# Patient Record
Sex: MALE | Race: BLACK OR AFRICAN AMERICAN | URBAN - METROPOLITAN AREA
[De-identification: names, ages, dates, MRNs, and addresses within clinical notes are randomized per-mention and may not be internally consistent; named-entity substitution may affect disease eponyms.]

---

## 2018-09-11 ENCOUNTER — ED HISTORICAL/CONVERTED ENCOUNTER (OUTPATIENT)
Dept: OTHER | Age: 58
End: 2018-09-11

## 2024-07-20 ENCOUNTER — APPOINTMENT (OUTPATIENT)
Facility: HOSPITAL | Age: 64
End: 2024-07-20
Payer: OTHER GOVERNMENT

## 2024-07-20 ENCOUNTER — HOSPITAL ENCOUNTER (EMERGENCY)
Facility: HOSPITAL | Age: 64
Discharge: HOME | End: 2024-07-20
Payer: OTHER GOVERNMENT

## 2024-07-20 VITALS
WEIGHT: 130 LBS | RESPIRATION RATE: 18 BRPM | TEMPERATURE: 98 F | OXYGEN SATURATION: 96 % | BODY MASS INDEX: 22.2 KG/M2 | SYSTOLIC BLOOD PRESSURE: 128 MMHG | HEIGHT: 64 IN | DIASTOLIC BLOOD PRESSURE: 76 MMHG | HEART RATE: 91 BPM

## 2024-07-20 DIAGNOSIS — S81.811A LACERATION OF RIGHT LOWER LEG WITHOUT COMPLICATION, INITIAL ENCOUNTER: Primary | ICD-10-CM

## 2024-07-20 PROCEDURE — 73590 X-RAY EXAM OF LOWER LEG: CPT

## 2024-07-20 PROCEDURE — 12002 RPR S/N/AX/GEN/TRNK2.6-7.5CM: CPT

## 2024-07-20 PROCEDURE — 99283 EMERGENCY DEPT VISIT LOW MDM: CPT

## 2024-07-20 PROCEDURE — 6370000000 HC RX 637 (ALT 250 FOR IP): Performed by: PHYSICIAN ASSISTANT

## 2024-07-20 PROCEDURE — 2500000003 HC RX 250 WO HCPCS: Performed by: PHYSICIAN ASSISTANT

## 2024-07-20 RX ORDER — LIDOCAINE HYDROCHLORIDE 10 MG/ML
10 INJECTION, SOLUTION INFILTRATION; PERINEURAL
Status: COMPLETED | OUTPATIENT
Start: 2024-07-20 | End: 2024-07-20

## 2024-07-20 RX ORDER — IBUPROFEN 600 MG/1
600 TABLET ORAL EVERY 6 HOURS PRN
Qty: 20 TABLET | Refills: 0 | Status: SHIPPED | OUTPATIENT
Start: 2024-07-20

## 2024-07-20 RX ORDER — BACITRACIN ZINC 500 [USP'U]/G
OINTMENT TOPICAL ONCE
Status: COMPLETED | OUTPATIENT
Start: 2024-07-20 | End: 2024-07-20

## 2024-07-20 RX ADMIN — BACITRACIN ZINC: 500 OINTMENT TOPICAL at 13:15

## 2024-07-20 RX ADMIN — LIDOCAINE HYDROCHLORIDE 10 ML: 10 INJECTION, SOLUTION INFILTRATION; PERINEURAL at 11:03

## 2024-07-20 ASSESSMENT — PAIN DESCRIPTION - LOCATION: LOCATION: LEG

## 2024-07-20 ASSESSMENT — PAIN - FUNCTIONAL ASSESSMENT
PAIN_FUNCTIONAL_ASSESSMENT: NONE - DENIES PAIN
PAIN_FUNCTIONAL_ASSESSMENT: 0-10
PAIN_FUNCTIONAL_ASSESSMENT: ACTIVITIES ARE NOT PREVENTED

## 2024-07-20 ASSESSMENT — PAIN DESCRIPTION - DESCRIPTORS: DESCRIPTORS: THROBBING

## 2024-07-20 ASSESSMENT — PAIN SCALES - GENERAL
PAINLEVEL_OUTOF10: 5
PAINLEVEL_OUTOF10: 0

## 2024-07-20 ASSESSMENT — PAIN DESCRIPTION - PAIN TYPE: TYPE: ACUTE PAIN

## 2024-07-20 ASSESSMENT — PAIN DESCRIPTION - ORIENTATION: ORIENTATION: LOWER;RIGHT
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
## 2024-07-20 NOTE — ED NOTES
Dressing placed over suture line with non-adherent guaze, covered with adaptic and kerlix. Instructed patient on wound care, he is able to return verbalize instructions.
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
## 2024-07-20 NOTE — DISCHARGE INSTRUCTIONS
PRIMARY CARE in [facility]:  [address] [place] [address].  [phone]  Jessi Myers MD Family Medicine  Jose Francisco Chery MD Family Medicine  Dagoberto Valadez MD Family Medicine    [facility]: [address] [place] [address]. [phone]   Francis Cummins MD Family Medicine  Jodi Parson, ROSIBEL Family Medicine  Polina Mcbride, ROSIBEL Family Medicine    [facility]: [address], [address], [place] [address]. [phone]  Nanda Ballard MD Family Medicine  Enriqueta Gonsales MD Family Medicine  Sohan Fields, ROS[facility]EL Family Medicine  Keyla Mccurdy, ROSIBEL Family Medicine    [facility]: [address], [address] [address]. [phone]  Bernadine Campa MD Internal Medicine  Sourav Horton MD Internal Medicine  Thom Goodrich MD Internal Medicine  Janee Allen, PA Family Medicine    [facility]: [address], [place] [address]. [phone]  Alexia Hinojosa MD Family Medicine  Janie France MD Family Medicine  Danny Luna, DO Infectious Disease  Otis Baker MD Family Medicine    [facility]: [address], [address], [place] [address]. [phone]  Patito Richards,  Family Medicine  Sadia Fields NP Internal Medicine  Yaima Sharp, ROSIBEL Internal Medicine    [facility]: [address] [place], [place] [address]. [phone]  Horacio Lombardo MD Internal Medicine  Héctor Dave MD Internal Medicine    [facility]: [address] [place] [address]. [phone]  Chely Santoro MD Family Medicine  Tess Carrizales MD Family Medicine  Baltazar Angulo MD Family Medicine  Tara Gamboa, ROSIBEL Family Medicine  Aba Fields, ARPN, CNP Family Medicine    [facility]

## 2024-07-21 ENCOUNTER — APPOINTMENT (OUTPATIENT)
Facility: HOSPITAL | Age: 64
End: 2024-07-21
Payer: OTHER GOVERNMENT

## 2024-07-21 ENCOUNTER — HOSPITAL ENCOUNTER (EMERGENCY)
Facility: HOSPITAL | Age: 64
Discharge: HOME | End: 2024-07-21
Attending: EMERGENCY MEDICINE
Payer: OTHER GOVERNMENT

## 2024-07-21 VITALS
DIASTOLIC BLOOD PRESSURE: 87 MMHG | BODY MASS INDEX: 22.2 KG/M2 | HEART RATE: 92 BPM | RESPIRATION RATE: 17 BRPM | OXYGEN SATURATION: 98 % | HEIGHT: 64 IN | WEIGHT: 130 LBS | SYSTOLIC BLOOD PRESSURE: 125 MMHG | TEMPERATURE: 98.3 F

## 2024-07-21 DIAGNOSIS — S93.402A SPRAIN OF LEFT ANKLE, UNSPECIFIED LIGAMENT, INITIAL ENCOUNTER: Primary | ICD-10-CM

## 2024-07-21 PROCEDURE — 6360000002 HC RX W HCPCS: Performed by: EMERGENCY MEDICINE

## 2024-07-21 PROCEDURE — 90471 IMMUNIZATION ADMIN: CPT | Performed by: EMERGENCY MEDICINE

## 2024-07-21 PROCEDURE — 90714 TD VACC NO PRESV 7 YRS+ IM: CPT | Performed by: EMERGENCY MEDICINE

## 2024-07-21 PROCEDURE — 6370000000 HC RX 637 (ALT 250 FOR IP): Performed by: EMERGENCY MEDICINE

## 2024-07-21 PROCEDURE — 73610 X-RAY EXAM OF ANKLE: CPT

## 2024-07-21 PROCEDURE — 99284 EMERGENCY DEPT VISIT MOD MDM: CPT

## 2024-07-21 RX ORDER — IBUPROFEN 600 MG/1
600 TABLET ORAL EVERY 6 HOURS PRN
Qty: 28 TABLET | Refills: 0 | Status: SHIPPED | OUTPATIENT
Start: 2024-07-21 | End: 2024-07-28

## 2024-07-21 RX ORDER — IBUPROFEN 200 MG
600 TABLET ORAL
Status: COMPLETED | OUTPATIENT
Start: 2024-07-21 | End: 2024-07-21

## 2024-07-21 RX ADMIN — CLOSTRIDIUM TETANI TOXOID ANTIGEN (FORMALDEHYDE INACTIVATED) AND CORYNEBACTERIUM DIPHTHERIAE TOXOID ANTIGEN (FORMALDEHYDE INACTIVATED) 0.5 ML: 5; 2 INJECTION, SUSPENSION INTRAMUSCULAR at 16:11

## 2024-07-21 RX ADMIN — IBUPROFEN 600 MG: 200 TABLET, FILM COATED ORAL at 16:11
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
## 2024-07-21 NOTE — ED PROVIDER NOTES
Applicable    Records Reviewed (source and summary of external notes): Prior medical records and Nursing notes    Vitals:    Vitals:    07/21/24 1447 07/21/24 1655   BP: (!) 140/89 125/87   Pulse: 82 92   Resp: 18 17   Temp: 98.4 °F (36.9 °C) 98.3 °F (36.8 °C)   TempSrc: Oral Oral   SpO2: 100% 98%   Weight: 59 kg (130 lb)    Height: 1.626 m (5' 4\")         Patient was given the following medications:  Medications   tetanus & diphtheria toxoids (adult) 5-2 LFU injection 0.5 mL (0.5 mLs IntraMUSCular Given 7/21/24 1611)   ibuprofen (ADVIL;MOTRIN) tablet 600 mg (600 mg Oral Given 7/21/24 1611)       CONSULTS: (Who and What was discussed)  No consults.     Social Determinants affecting Dx or Tx: None    PROCEDURES   Procedures   No procedures.     Smoking Cessation: None.    CRITICAL CARE TIME     Patient care does not meet critical care criteria.     DISPOSITION/PLAN   DISPOSITION Decision To Discharge 07/21/2024 04:48:58 PM      Discharged.     PATIENT REFERRED TO:  Rudy Liu MD  [address]  [place] [id number] [phone]    Schedule an appointment as soon as possible for a visit   Orthopedics      DISCHARGE MEDICATIONS:     Medication List        CHANGE how you take these medications      * ibuprofen 600 MG tablet  Commonly known as: ADVIL;MOTRIN  Take 1 tablet by mouth every 6 hours as needed for Pain  What changed: Another medication with the same name was added. Make sure you understand how and when to take each.     * ibuprofen 600 MG tablet  Commonly known as: IBU  Take 1 tablet by mouth every 6 hours as needed for Pain  What changed: You were already taking a medication with the same name, and this prescription was added. Make sure you understand how and when to take each.           * This list has 2 medication(s) that are the same as other medications prescribed for you. Read the directions carefully, and ask your doctor or other care provider to review them with you.